# Patient Record
Sex: MALE | Race: WHITE | URBAN - METROPOLITAN AREA
[De-identification: names, ages, dates, MRNs, and addresses within clinical notes are randomized per-mention and may not be internally consistent; named-entity substitution may affect disease eponyms.]

---

## 2021-06-24 ENCOUNTER — TRANSCRIBE ORDER (OUTPATIENT)
Dept: SCHEDULING | Age: 66
End: 2021-06-24

## 2021-06-24 DIAGNOSIS — R06.89 DYSPNEA AND RESPIRATORY ABNORMALITY: Primary | ICD-10-CM

## 2021-06-24 DIAGNOSIS — R06.00 DYSPNEA AND RESPIRATORY ABNORMALITY: Primary | ICD-10-CM

## 2021-06-24 DIAGNOSIS — I10 ESSENTIAL HYPERTENSION, MALIGNANT: ICD-10-CM

## 2021-07-02 ENCOUNTER — HOSPITAL ENCOUNTER (OUTPATIENT)
Dept: CT IMAGING | Age: 66
Discharge: HOME | End: 2021-07-02
Payer: MEDICARE

## 2021-07-02 VITALS — HEART RATE: 66 BPM | DIASTOLIC BLOOD PRESSURE: 79 MMHG | SYSTOLIC BLOOD PRESSURE: 124 MMHG

## 2021-07-02 DIAGNOSIS — I10 ESSENTIAL HYPERTENSION, MALIGNANT: ICD-10-CM

## 2021-07-02 DIAGNOSIS — R06.00 DYSPNEA AND RESPIRATORY ABNORMALITY: ICD-10-CM

## 2021-07-02 DIAGNOSIS — R06.89 DYSPNEA AND RESPIRATORY ABNORMALITY: ICD-10-CM

## 2021-07-02 LAB — CREAT BLD-MCNC: 1.2 MG/DL (ref 0.6–1.3)

## 2021-07-02 PROCEDURE — 74011250637 HC RX REV CODE- 250/637

## 2021-07-02 PROCEDURE — 74011000636 HC RX REV CODE- 636

## 2021-07-02 PROCEDURE — 75574 CT ANGIO HRT W/3D IMAGE: CPT

## 2021-07-02 PROCEDURE — 82565 ASSAY OF CREATININE: CPT

## 2021-07-02 PROCEDURE — 74011000250 HC RX REV CODE- 250

## 2021-07-02 RX ORDER — IODIXANOL 320 MG/ML
100 INJECTION, SOLUTION INTRAVASCULAR ONCE
Status: COMPLETED | OUTPATIENT
Start: 2021-07-02 | End: 2021-07-02

## 2021-07-02 RX ORDER — METOPROLOL TARTRATE 5 MG/5ML
5 INJECTION INTRAVENOUS ONCE
Status: COMPLETED | OUTPATIENT
Start: 2021-07-02 | End: 2021-07-02

## 2021-07-02 RX ORDER — NITROGLYCERIN 0.4 MG/1
0.4 TABLET SUBLINGUAL AS NEEDED
Status: DISCONTINUED | OUTPATIENT
Start: 2021-07-02 | End: 2021-07-06 | Stop reason: HOSPADM

## 2021-07-02 RX ADMIN — NITROGLYCERIN 0.4 MG: 0.4 TABLET, ORALLY DISINTEGRATING SUBLINGUAL at 09:32

## 2021-07-02 RX ADMIN — METOPROLOL TARTRATE 5 MG: 5 INJECTION, SOLUTION INTRAVENOUS at 09:31

## 2021-07-02 RX ADMIN — IODIXANOL 100 ML: 320 INJECTION, SOLUTION INTRAVASCULAR at 09:00

## 2021-07-02 NOTE — PROGRESS NOTES
0830- Pt to CT room. IV SL established by Fausto Collado, RT. Pt tolerated well. Pt did not  beta blocker from pharmacy to take PTA. HR-74    1716- One dose IV metoprolol given. HR- 74    0845- CT scan begins. HR-66    0855- NTG given. IV patent. No complaints. HR-61    5741- CT scan complete. Pt tolerated procedure well. 8073- IV D/C'd intact without problem. D/C instructions reviewed with pt and copy given. Verbalized understanding. D/C'd yanely, daniel, NAD.

## 2021-07-11 NOTE — PROCEDURES
[address] CATH    Name:  Nathan Holley  MR#:  [id number]  :  1955  ACCOUNT #:  [de-identified]  DATE OF SERVICE:  2021    PROCEDURE PERFORMED:  Coronary CTA. PREOPERATIVE DIAGNOSIS: chest pain    POSTOPERATIVE DIAGNOSIS:  CAD    SURGEON:  Kaylee Najera MD    ASSISTANT: none    ESTIMATED BLOOD LOSS:  0 cc    SPECIMENS REMOVED:  none    COMPLICATIONS:  none    IMPLANTS:  none    ANESTHESIA:  none    RESULTS:  The extracardiac portion of this test is dictated by Radiology Services. CORONARY ANATOMY:  1. Left main coronary artery arises normally from the sinus of Valsalva. There is no atherosclerotic disease seen in the left main. 2.  The LAD is a moderate-sized vessel, which extends around the apex. The LAD gives off a moderate-sized diagonal branch followed by a small diagonal branch. The proximal LAD is calcified with noncritical atherosclerotic plaque. In the mid LAD at the origin of the second diagonal branch, there is atherosclerotic narrowing, 40% in severity. Just beyond the second diagonal branch, there is a 70% significant atherosclerotic plaque in the left main with soft plaque and calcium. First diagonal branch is narrowed without atherosclerotic disease, 30% in severity. The second diagonal which is a small vessel has plaque in the ostium of the vessels with the 60% narrowing. 3.  The circumflex is a moderate-sized nondominant vessel, which gives off a small obtuse marginal branch and small posterolateral branch. There is no significant atherosclerotic disease seen in the circumflex system. 4.  The right coronary artery is a moderate-sized dominant vessel, which gives off a moderate-sized posterolateral branch and a small PDA branch. There is calcific plaque in the mid right coronary artery with narrowing of the vessel. The most severe narrowing is 80% in severity with calcified and soft plaque.     LEFT VENTRICULAR ANALYSIS:  The left ventricle is normal in size. The left ventricular myocardium appeared normal.  There is small global hypokinesis seen. The left ventricular ejection fraction is calculated to be 42%. CONCLUSION:  1. Right dominant coronary artery system. 2.  Significant stenoses in the mid right coronary artery, 80% in severity, and in the left anterior descending 70% in severity. 3.  Left ventricular analysis with normal left ventricular size, global hypokinesis, and ejection fraction of 42%.         Emma Palmer MD      [id number]  D:  07/10/2021 14:57  T:  07/11/2021 4:23  JOB #:  [id number]  CC:  Sebastian Barr MD

## 2022-09-12 ENCOUNTER — HOSPITAL ENCOUNTER (EMERGENCY)
Age: 67
Discharge: HOME | End: 2022-09-12
Attending: STUDENT IN AN ORGANIZED HEALTH CARE EDUCATION/TRAINING PROGRAM
Payer: MEDICARE

## 2022-09-12 ENCOUNTER — APPOINTMENT (OUTPATIENT)
Dept: GENERAL RADIOLOGY | Age: 67
End: 2022-09-12
Attending: STUDENT IN AN ORGANIZED HEALTH CARE EDUCATION/TRAINING PROGRAM
Payer: MEDICARE

## 2022-09-12 VITALS
HEART RATE: 78 BPM | TEMPERATURE: 98.3 F | DIASTOLIC BLOOD PRESSURE: 67 MMHG | RESPIRATION RATE: 18 BRPM | SYSTOLIC BLOOD PRESSURE: 116 MMHG | BODY MASS INDEX: 17.41 KG/M2 | WEIGHT: 140 LBS | OXYGEN SATURATION: 94 % | HEIGHT: 75 IN

## 2022-09-12 DIAGNOSIS — E87.6 HYPOKALEMIA: ICD-10-CM

## 2022-09-12 DIAGNOSIS — R42 LIGHTHEADEDNESS: Primary | ICD-10-CM

## 2022-09-12 LAB
ALBUMIN SERPL-MCNC: 3.1 G/DL (ref 3.5–5)
ALBUMIN/GLOB SERPL: 0.7 {RATIO} (ref 1.1–2.2)
ALP SERPL-CCNC: 104 U/L (ref 45–117)
ALT SERPL-CCNC: 8 U/L (ref 12–78)
ANION GAP SERPL CALC-SCNC: 13 MMOL/L (ref 5–15)
AST SERPL-CCNC: 10 U/L (ref 15–37)
BASOPHILS # BLD: 0 K/UL (ref 0–0.1)
BASOPHILS NFR BLD: 1 % (ref 0–1)
BILIRUB SERPL-MCNC: 0.4 MG/DL (ref 0.2–1)
BNP SERPL-MCNC: 235 PG/ML
BUN SERPL-MCNC: 10 MG/DL (ref 6–20)
BUN/CREAT SERPL: 9 (ref 12–20)
CALCIUM SERPL-MCNC: 8.9 MG/DL (ref 8.5–10.1)
CHLORIDE SERPL-SCNC: 101 MMOL/L (ref 97–108)
CO2 SERPL-SCNC: 23 MMOL/L (ref 21–32)
CREAT SERPL-MCNC: 1.07 MG/DL (ref 0.7–1.3)
DIFFERENTIAL METHOD BLD: ABNORMAL
EOSINOPHIL # BLD: 0.1 K/UL (ref 0–0.4)
EOSINOPHIL NFR BLD: 2 % (ref 0–7)
ERYTHROCYTE [DISTWIDTH] IN BLOOD BY AUTOMATED COUNT: 14.9 % (ref 11.5–14.5)
GLOBULIN SER CALC-MCNC: 4.2 G/DL (ref 2–4)
GLUCOSE SERPL-MCNC: 93 MG/DL (ref 65–100)
HCT VFR BLD AUTO: 37.9 % (ref 36.6–50.3)
HGB BLD-MCNC: 13 G/DL (ref 12.1–17)
IMM GRANULOCYTES # BLD AUTO: 0 K/UL (ref 0–0.04)
IMM GRANULOCYTES NFR BLD AUTO: 0 % (ref 0–0.5)
LYMPHOCYTES # BLD: 1.5 K/UL (ref 0.8–3.5)
LYMPHOCYTES NFR BLD: 33 % (ref 12–49)
MAGNESIUM SERPL-MCNC: 1.9 MG/DL (ref 1.6–2.4)
MCH RBC QN AUTO: 33.3 PG (ref 26–34)
MCHC RBC AUTO-ENTMCNC: 34.3 G/DL (ref 30–36.5)
MCV RBC AUTO: 97.2 FL (ref 80–99)
MONOCYTES # BLD: 0.5 K/UL (ref 0–1)
MONOCYTES NFR BLD: 10 % (ref 5–13)
NEUTS SEG # BLD: 2.4 K/UL (ref 1.8–8)
NEUTS SEG NFR BLD: 54 % (ref 32–75)
NRBC # BLD: 0.02 K/UL (ref 0–0.01)
NRBC BLD-RTO: 0.4 PER 100 WBC
PLATELET # BLD AUTO: 205 K/UL (ref 150–400)
PMV BLD AUTO: 10.3 FL (ref 8.9–12.9)
POTASSIUM SERPL-SCNC: 2.9 MMOL/L (ref 3.5–5.1)
PROT SERPL-MCNC: 7.3 G/DL (ref 6.4–8.2)
RBC # BLD AUTO: 3.9 M/UL (ref 4.1–5.7)
SODIUM SERPL-SCNC: 137 MMOL/L (ref 136–145)
TROPONIN-HIGH SENSITIVITY: 5 NG/L (ref 0–76)
WBC # BLD AUTO: 4.5 K/UL (ref 4.1–11.1)

## 2022-09-12 PROCEDURE — 96366 THER/PROPH/DIAG IV INF ADDON: CPT

## 2022-09-12 PROCEDURE — 96368 THER/DIAG CONCURRENT INF: CPT

## 2022-09-12 PROCEDURE — 80053 COMPREHEN METABOLIC PANEL: CPT

## 2022-09-12 PROCEDURE — 83880 ASSAY OF NATRIURETIC PEPTIDE: CPT

## 2022-09-12 PROCEDURE — 96365 THER/PROPH/DIAG IV INF INIT: CPT

## 2022-09-12 PROCEDURE — 36415 COLL VENOUS BLD VENIPUNCTURE: CPT

## 2022-09-12 PROCEDURE — 99285 EMERGENCY DEPT VISIT HI MDM: CPT

## 2022-09-12 PROCEDURE — 71045 X-RAY EXAM CHEST 1 VIEW: CPT

## 2022-09-12 PROCEDURE — 83735 ASSAY OF MAGNESIUM: CPT

## 2022-09-12 PROCEDURE — 74011250636 HC RX REV CODE- 250/636: Performed by: STUDENT IN AN ORGANIZED HEALTH CARE EDUCATION/TRAINING PROGRAM

## 2022-09-12 PROCEDURE — 84484 ASSAY OF TROPONIN QUANT: CPT

## 2022-09-12 PROCEDURE — 85025 COMPLETE CBC W/AUTO DIFF WBC: CPT

## 2022-09-12 PROCEDURE — 74011250637 HC RX REV CODE- 250/637: Performed by: STUDENT IN AN ORGANIZED HEALTH CARE EDUCATION/TRAINING PROGRAM

## 2022-09-12 PROCEDURE — 93005 ELECTROCARDIOGRAM TRACING: CPT

## 2022-09-12 RX ORDER — POTASSIUM CHLORIDE 750 MG/1
40 TABLET, FILM COATED, EXTENDED RELEASE ORAL
Status: COMPLETED | OUTPATIENT
Start: 2022-09-12 | End: 2022-09-12

## 2022-09-12 RX ORDER — POTASSIUM CHLORIDE 750 MG/1
10 TABLET, FILM COATED, EXTENDED RELEASE ORAL 2 TIMES DAILY
Qty: 14 TABLET | Refills: 0 | Status: SHIPPED | OUTPATIENT
Start: 2022-09-12 | End: 2022-09-19

## 2022-09-12 RX ORDER — LANOLIN ALCOHOL/MO/W.PET/CERES
400 CREAM (GRAM) TOPICAL DAILY
Qty: 14 TABLET | Refills: 0 | Status: SHIPPED | OUTPATIENT
Start: 2022-09-12

## 2022-09-12 RX ORDER — MAGNESIUM SULFATE HEPTAHYDRATE 40 MG/ML
2 INJECTION, SOLUTION INTRAVENOUS
Status: COMPLETED | OUTPATIENT
Start: 2022-09-12 | End: 2022-09-12

## 2022-09-12 RX ORDER — POTASSIUM CHLORIDE 7.45 MG/ML
10 INJECTION INTRAVENOUS ONCE
Status: COMPLETED | OUTPATIENT
Start: 2022-09-12 | End: 2022-09-12

## 2022-09-12 RX ADMIN — POTASSIUM CHLORIDE 40 MEQ: 750 TABLET, FILM COATED, EXTENDED RELEASE ORAL at 13:32

## 2022-09-12 RX ADMIN — POTASSIUM CHLORIDE 10 MEQ: 7.46 INJECTION, SOLUTION INTRAVENOUS at 13:32

## 2022-09-12 RX ADMIN — SODIUM CHLORIDE 500 ML: 9 INJECTION, SOLUTION INTRAVENOUS at 12:33

## 2022-09-12 RX ADMIN — MAGNESIUM SULFATE IN WATER 2 G: 40 INJECTION, SOLUTION INTRAVENOUS at 12:32

## 2022-09-12 NOTE — ED PROVIDER NOTES
EMERGENCY DEPARTMENT HISTORY AND PHYSICAL EXAM      Date: 9/12/2022  Patient Name: Latosha Ashby III    History of Presenting Illness     Chief Complaint   Patient presents with    Dizziness     Pt was at [facility] and had a sudden onset of dizziness with some SOB. Denies cp. EMS reported a low bp 98/76. HPI: Ej Lobato, 75 y.o. male presents to the ED with cc of episode of lightheadedness. He was at [facility], stood up, then became lightheaded, felt like he was about to pass out. Denies any loss of consciousness or fall. Now feels better, denies complaints, states that he wants to get out of here. He denies any acute shortness of breath, no chest pain. He denies any weakness numbness or tingling in arms or legs. No recent cough or fevers, no abdominal pain vomiting or diarrhea. He is a daily drinker, drinks about half 1/5 of liquor daily, his last drink was last night. He denies any tremors or withdrawal symptoms currently. There are no other complaints, changes, or physical findings at this time.     PCP: Anjel Tran MD    Medications: Denies taking any current medications      Past History     Past Medical History:  Past Medical History:   Diagnosis Date    Joint pain 3/18/2011    Sebaceous cyst 3/22/2011       Past Surgical History:  Past Surgical History:   Procedure Laterality Date    HX CYST REMOVAL  3/22/11    excision of sebaceous cyst middle of back    HX OTHER SURGICAL  11/04/09    exc sebaceous cyst left side of face Dr Shira Hartley       Family History:  Family History   Problem Relation Age of Onset    Liver Disease Father        Social History:  Social History     Tobacco Use    Smoking status: Every Day     Packs/day: 2.00     Types: Cigarettes    Smokeless tobacco: Never   Substance Use Topics    Alcohol use: Yes     Comment: socially       Allergies:  No Known Allergies      Review of Systems   no fever  No ear pain  No eye pain  No acute shortness of breath  no chest pain  no abdominal pain  no dysuria  no leg pain  No rash  No lymphadenopathy  No weight loss    Physical Exam   Physical Exam  Constitutional:       General: He is not in acute distress. Appearance: He is not toxic-appearing. HENT:      Head: Normocephalic and atraumatic. Eyes:      Extraocular Movements: Extraocular movements intact. Cardiovascular:      Rate and Rhythm: Normal rate and regular rhythm. Pulmonary:      Effort: Pulmonary effort is normal.      Breath sounds: Normal breath sounds. Abdominal:      Palpations: Abdomen is soft. Tenderness: There is no abdominal tenderness. Musculoskeletal:         General: No deformity. Cervical back: Neck supple. Comments: Trace pitting edema bilateral lower extremities   Skin:     General: Skin is warm and dry. Neurological:      General: No focal deficit present. Mental Status: He is alert and oriented to person, place, and time. Cranial Nerves: No cranial nerve deficit. Comments: 5/5 strength with bicep flexion and extension bilaterally, 5/5 strength with ankle flexion and extension bilaterally. Sensation to light touch intact over upper and lower extremities bilaterally. Normal finger-to-nose test bilaterally. Normal straight arm and straight leg raise bilaterally. Psychiatric:         Mood and Affect: Mood normal.       Diagnostic Study Results     Labs -   No results found for this or any previous visit (from the past 24 hour(s)). Radiologic Studies -   XR CHEST PORT    (Results Pending)     CT Results  (Last 48 hours)      None          CXR Results  (Last 48 hours)      None              Medical Decision Making   I am the first provider for this patient. I reviewed the vital signs, available nursing notes, past medical history, past surgical history, family history and social history. Vital Signs-Reviewed the patient's vital signs.   Patient Vitals for the past 24 hrs:   Temp Pulse Resp BP SpO2 09/12/22 1200 -- 97 18 117/79 94 %   09/12/22 1148 98.3 °F (36.8 °C) (!) 126 22 110/66 94 %         Provider Notes (Medical Decision Making):   29-year-old male presenting with episode of lightheadedness. Differential includes orthostatic hypotension, vasovagal presyncope, arrhythmia, electrolyte or metabolic abnormalities, volume depletion. He denies chest pain or shortness of breath, he is saturating well on room air, on my evaluation vitals are unremarkable and he is not tachycardic, unlikely PE or CHF. Given age and comorbidities, will assess for possible atypical ACS, however this is unlikely especially as he is currently asymptomatic and denies any history of chest pain or shortness of breath during this episode. His neurologic exam is unremarkable, he denies headache, he is well-appearing, unlikely acute intracranial emergency. ED Course:     Initial assessment performed. The patients presenting problems have been discussed, and they are in agreement with the care plan formulated and outlined with them. I have encouraged them to ask questions as they arise throughout their visit. Chart reviewed, was admitted at [facility] in 2021, noted to have history of alcohol dependence, coronary artery disease status post stenting in April 2021 at [facility] At that time, told to stop nifedipine and stop his aspirin until 8/13, however patient states he never restarted his medications. EKG is performed at 11: 59, shows sinus rhythm at a rate of 96, , , QTc 500, axis left deviated, no ST segment elevation or depression concerning for ACS, frequent PVCs, this is interpreted as sinus rhythm with left axis deviation and prolonged QTC. Patient is given magnesium supplementation. CBC negative for leukocytosis or anemia, basic metabolic panel with normal renal function with a creatinine of 1.07, potassium is low at 2.9 which will be replaced orally and IV.   Chest x-ray shows no acute process. Throughout his stay here, patient remains in normal sinus rhythm, heart rate of 78 on the monitor. He continues to deny pain or complaints. Is comfortable on reevaluation. Patient is counseled on supportive care and return precautions. Will return to the ED for any worsening lightheadedness, any shortness of breath or chest pain or palpitations, or any new or worrisome symptoms. Will followup with primary care doctor, cardiologist within 2 days. Critical Care Time:         Disposition:  Home    PLAN:  1. There are no discharge medications for this patient.     2.   Follow-up Information    None       Return to ED if worse     Diagnosis     Clinical Impression: Acute episode of lightheadedness, acute hypokalemia

## 2022-09-13 LAB
ATRIAL RATE: 96 BPM
CALCULATED P AXIS, ECG09: 29 DEGREES
CALCULATED R AXIS, ECG10: -76 DEGREES
CALCULATED T AXIS, ECG11: 48 DEGREES
DIAGNOSIS, 93000: NORMAL
P-R INTERVAL, ECG05: 162 MS
Q-T INTERVAL, ECG07: 396 MS
QRS DURATION, ECG06: 106 MS
QTC CALCULATION (BEZET), ECG08: 500 MS
VENTRICULAR RATE, ECG03: 96 BPM